# Patient Record
Sex: MALE | Race: BLACK OR AFRICAN AMERICAN | Employment: FULL TIME | URBAN - METROPOLITAN AREA
[De-identification: names, ages, dates, MRNs, and addresses within clinical notes are randomized per-mention and may not be internally consistent; named-entity substitution may affect disease eponyms.]

---

## 2019-08-19 ENCOUNTER — HOSPITAL ENCOUNTER (EMERGENCY)
Age: 19
Discharge: HOME | End: 2019-08-20
Attending: EMERGENCY MEDICINE
Payer: SELF-PAY

## 2019-08-19 VITALS
HEART RATE: 73 BPM | RESPIRATION RATE: 16 BRPM | DIASTOLIC BLOOD PRESSURE: 63 MMHG | OXYGEN SATURATION: 98 % | SYSTOLIC BLOOD PRESSURE: 99 MMHG | WEIGHT: 130 LBS | BODY MASS INDEX: 19.26 KG/M2 | TEMPERATURE: 99 F | HEIGHT: 69 IN

## 2019-08-19 DIAGNOSIS — N34.2 URETHRITIS: Primary | ICD-10-CM

## 2019-08-19 LAB
APPEARANCE UR: ABNORMAL
BACTERIA URNS QL MICRO: 0 /HPF
BILIRUB UR QL: NEGATIVE
CASTS URNS QL MICRO: ABNORMAL /LPF
COLOR UR: YELLOW
EPI CELLS #/AREA URNS HPF: ABNORMAL /HPF
GLUCOSE UR STRIP.AUTO-MCNC: NEGATIVE MG/DL
HGB UR QL STRIP: ABNORMAL
KETONES UR QL STRIP.AUTO: NEGATIVE MG/DL
LEUKOCYTE ESTERASE UR QL STRIP.AUTO: ABNORMAL
NITRITE UR QL STRIP.AUTO: NEGATIVE
OTHER OBSERVATIONS,UCOM: ABNORMAL
PH UR STRIP: 7 [PH] (ref 5–9)
PROT UR STRIP-MCNC: 30 MG/DL
RBC #/AREA URNS HPF: ABNORMAL /HPF
SP GR UR REFRACTOMETRY: 1.02 (ref 1–1.02)
UROBILINOGEN UR QL STRIP.AUTO: 1 EU/DL (ref 0.2–1)
WBC URNS QL MICRO: >100 /HPF

## 2019-08-19 PROCEDURE — 81001 URINALYSIS AUTO W/SCOPE: CPT

## 2019-08-19 PROCEDURE — 74011250636 HC RX REV CODE- 250/636: Performed by: EMERGENCY MEDICINE

## 2019-08-19 PROCEDURE — 99283 EMERGENCY DEPT VISIT LOW MDM: CPT | Performed by: EMERGENCY MEDICINE

## 2019-08-19 PROCEDURE — 87491 CHLMYD TRACH DNA AMP PROBE: CPT

## 2019-08-19 PROCEDURE — 96372 THER/PROPH/DIAG INJ SC/IM: CPT | Performed by: EMERGENCY MEDICINE

## 2019-08-19 PROCEDURE — 74011250637 HC RX REV CODE- 250/637: Performed by: EMERGENCY MEDICINE

## 2019-08-19 RX ORDER — AZITHROMYCIN 250 MG/1
1000 TABLET, FILM COATED ORAL
Status: COMPLETED | OUTPATIENT
Start: 2019-08-19 | End: 2019-08-19

## 2019-08-19 RX ADMIN — AZITHROMYCIN MONOHYDRATE 1000 MG: 250 TABLET ORAL at 23:45

## 2019-08-19 RX ADMIN — LIDOCAINE HYDROCHLORIDE 250 MG: 10 INJECTION, SOLUTION INFILTRATION; PERINEURAL at 23:46

## 2019-08-19 NOTE — LETTER
8/24/2019 Gabriel Stanford [address] [place] [address] Dear David Karen Prajapati, You were recently seen in the Emergency Department of [facility] and had blood work or x-rays performed. We would like to discuss these with you. Please call the Emergency Department at your earliest convenience. If you were seen at [facility], please dial [phone], and if you were seen at [facility], please call [phone] to speak with one of our providers. Sincerely, Georgette Gillespie MD 
Medical Director Emergency Services, [address] [place] [address]  
[phone]/ [phone]

## 2019-08-20 NOTE — ED PROVIDER NOTES
Patient is a 18-year-old male who comes to the ER today complaining of dysuria, and urethral discharge for the past 2 days. He did have unprotected sex with a new partner several times a few days before this. He denies any fever. No vomiting. The history is provided by the patient. Exposure to STD   The incident occurred more than 2 days ago. Associated symptoms include penile discharge. Pertinent negatives include no loss of consciousness, no nausea and no abdominal pain. There is a concern regarding sexually transmitted diseases. He has tried nothing for the symptoms. History reviewed. No pertinent past medical history. History reviewed. No pertinent surgical history. History reviewed. No pertinent family history.     Social History     Socioeconomic History    Marital status: SINGLE     Spouse name: Not on file    Number of children: Not on file    Years of education: Not on file    Highest education level: Not on file   Occupational History    Not on file   Social Needs    Financial resource strain: Not on file    Food insecurity:     Worry: Not on file     Inability: Not on file    Transportation needs:     Medical: Not on file     Non-medical: Not on file   Tobacco Use    Smoking status: Never Smoker    Smokeless tobacco: Never Used   Substance and Sexual Activity    Alcohol use: No    Drug use: No    Sexual activity: Never   Lifestyle    Physical activity:     Days per week: Not on file     Minutes per session: Not on file    Stress: Not on file   Relationships    Social connections:     Talks on phone: Not on file     Gets together: Not on file     Attends Yarsanism service: Not on file     Active member of club or organization: Not on file     Attends meetings of clubs or organizations: Not on file     Relationship status: Not on file    Intimate partner violence:     Fear of current or ex partner: Not on file     Emotionally abused: Not on file     Physically abused: Not on file     Forced sexual activity: Not on file   Other Topics Concern    Not on file   Social History Narrative    Not on file         ALLERGIES: Patient has no known allergies. Review of Systems   Constitutional: Negative for chills, fatigue and fever. HENT: Negative for congestion, rhinorrhea and sore throat. Eyes: Negative for pain, discharge and visual disturbance. Respiratory: Negative for cough and shortness of breath. Cardiovascular: Negative for chest pain and palpitations. Gastrointestinal: Negative for abdominal pain, diarrhea and nausea. Endocrine: Negative for polydipsia and polyuria. Genitourinary: Positive for discharge, dysuria and penile discharge. Negative for frequency and urgency. Musculoskeletal: Negative for back pain and neck pain. Skin: Negative for rash. Neurological: Negative for seizures, loss of consciousness, syncope and weakness. Hematological: Negative. Vitals:    08/19/19 2227   BP: 99/63   Pulse: 73   Resp: 16   Temp: 99 °F (37.2 °C)   SpO2: 98%   Weight: 59 kg (130 lb)   Height: 5' 9\" (1.753 m)            Physical Exam   Constitutional: He is oriented to person, place, and time. He appears well-developed and well-nourished. HENT:   Head: Normocephalic and atraumatic. Eyes: Pupils are equal, round, and reactive to light. Conjunctivae and EOM are normal.   Neck: Normal range of motion. Neck supple. Cardiovascular: Normal rate, regular rhythm and intact distal pulses. Pulmonary/Chest: Effort normal and breath sounds normal.   Abdominal: Soft. There is no tenderness. There is no rebound and no guarding. Hernia confirmed negative in the right inguinal area and confirmed negative in the left inguinal area. Genitourinary: Testes normal. Circumcised. Discharge found. Musculoskeletal: Normal range of motion. He exhibits no edema or tenderness. Lymphadenopathy:     He has no cervical adenopathy.  Inguinal adenopathy noted on the right and left side. Neurological: He is alert and oriented to person, place, and time. He has normal strength. No cranial nerve deficit or sensory deficit. GCS eye subscore is 4. GCS verbal subscore is 5. GCS motor subscore is 6. Skin: Skin is warm and dry. No rash noted. Nursing note and vitals reviewed. MDM  Number of Diagnoses or Management Options  Diagnosis management comments: Clinically patient has urethritis on exam we will empirically treat with Rocephin and Zithromax in the ER tonight. Voice dictation software was used during the making of this note. This software is not perfect and grammatical and other typographical errors may be present. This note has been proofread, but may still contain errors.   Gisela Scruggs MD; 8/19/2019 @11:43 PM   ===================================================================         Amount and/or Complexity of Data Reviewed  Independent visualization of images, tracings, or specimens: yes    Risk of Complications, Morbidity, and/or Mortality  Presenting problems: low  Diagnostic procedures: minimal  Management options: minimal    Patient Progress  Patient progress: stable         Procedures

## 2019-08-20 NOTE — ED NOTES

## 2019-08-20 NOTE — DISCHARGE INSTRUCTIONS
You were empirically treated with antibiotics for sexually transmitted infection here in the emergency department tonight. Make sure all of your partners are evaluated and treated. Practice safe sex.

## 2019-08-22 LAB
C TRACH RRNA SPEC QL NAA+PROBE: POSITIVE
N GONORRHOEA RRNA SPEC QL NAA+PROBE: POSITIVE
SPECIMEN SOURCE: ABNORMAL

## 2019-08-24 NOTE — PROGRESS NOTES
Treated appropriately in the ED. Called to inform the patient. Phone number was wrong, will send a letter.

## 2020-06-22 ENCOUNTER — HOSPITAL ENCOUNTER (EMERGENCY)
Age: 20
Discharge: HOME | End: 2020-06-22
Attending: EMERGENCY MEDICINE
Payer: SELF-PAY

## 2020-06-22 VITALS
HEART RATE: 74 BPM | TEMPERATURE: 98 F | OXYGEN SATURATION: 100 % | DIASTOLIC BLOOD PRESSURE: 70 MMHG | HEIGHT: 69 IN | SYSTOLIC BLOOD PRESSURE: 131 MMHG | RESPIRATION RATE: 16 BRPM | BODY MASS INDEX: 20.73 KG/M2 | WEIGHT: 140 LBS

## 2020-06-22 DIAGNOSIS — R30.0 DYSURIA: Primary | ICD-10-CM

## 2020-06-22 LAB
BACTERIA URNS QL MICRO: 0 /HPF
CASTS URNS QL MICRO: NORMAL /LPF
EPI CELLS #/AREA URNS HPF: NORMAL /HPF
RBC #/AREA URNS HPF: NORMAL /HPF
WBC URNS QL MICRO: NORMAL /HPF

## 2020-06-22 PROCEDURE — 74011250636 HC RX REV CODE- 250/636: Performed by: EMERGENCY MEDICINE

## 2020-06-22 PROCEDURE — 74011250637 HC RX REV CODE- 250/637: Performed by: EMERGENCY MEDICINE

## 2020-06-22 PROCEDURE — 74011000250 HC RX REV CODE- 250: Performed by: EMERGENCY MEDICINE

## 2020-06-22 PROCEDURE — 81015 MICROSCOPIC EXAM OF URINE: CPT

## 2020-06-22 PROCEDURE — 87491 CHLMYD TRACH DNA AMP PROBE: CPT

## 2020-06-22 PROCEDURE — 99283 EMERGENCY DEPT VISIT LOW MDM: CPT

## 2020-06-22 PROCEDURE — 96372 THER/PROPH/DIAG INJ SC/IM: CPT

## 2020-06-22 RX ORDER — AZITHROMYCIN 250 MG/1
1000 TABLET, FILM COATED ORAL
Status: COMPLETED | OUTPATIENT
Start: 2020-06-22 | End: 2020-06-22

## 2020-06-22 RX ADMIN — LIDOCAINE HYDROCHLORIDE 250 MG: 10 INJECTION, SOLUTION INFILTRATION; PERINEURAL at 20:51

## 2020-06-22 RX ADMIN — AZITHROMYCIN 1000 MG: 250 TABLET, FILM COATED ORAL at 20:51

## 2020-06-23 NOTE — ED NOTES
I have reviewed discharge instructions with the patient. The patient verbalized understanding. Patient left ED via Discharge Method: ambulatory to Home with self. Opportunity for questions and clarification provided. Patient given 0 scripts. To continue your aftercare when you leave the hospital, you may receive an automated call from our care team to check in on how you are doing. This is a free service and part of our promise to provide the best care and service to meet your aftercare needs.  If you have questions, or wish to unsubscribe from this service please call [phone]. Thank you for Choosing our [facility] Emergency Department.

## 2020-06-23 NOTE — ED PROVIDER NOTES
Patient states he has had a penile discharge with some dysuria for about a week. He has recently had unprotected sex. He denies any fever or vomiting. He has had an STD before and got treated. He states this feels the same though not as severe as the first time. No past medical history on file. No past surgical history on file. No family history on file. Social History     Socioeconomic History    Marital status: SINGLE     Spouse name: Not on file    Number of children: Not on file    Years of education: Not on file    Highest education level: Not on file   Occupational History    Not on file   Social Needs    Financial resource strain: Not on file    Food insecurity     Worry: Not on file     Inability: Not on file    Transportation needs     Medical: Not on file     Non-medical: Not on file   Tobacco Use    Smoking status: Never Smoker    Smokeless tobacco: Never Used   Substance and Sexual Activity    Alcohol use: No    Drug use: No    Sexual activity: Never   Lifestyle    Physical activity     Days per week: Not on file     Minutes per session: Not on file    Stress: Not on file   Relationships    Social connections     Talks on phone: Not on file     Gets together: Not on file     Attends Moravian service: Not on file     Active member of club or organization: Not on file     Attends meetings of clubs or organizations: Not on file     Relationship status: Not on file    Intimate partner violence     Fear of current or ex partner: Not on file     Emotionally abused: Not on file     Physically abused: Not on file     Forced sexual activity: Not on file   Other Topics Concern    Not on file   Social History Narrative    Not on file         ALLERGIES: Patient has no known allergies. Review of Systems   Constitutional: Negative for chills and fever. Gastrointestinal: Negative for nausea and vomiting. All other systems reviewed and are negative.       Vitals: 06/22/20 1855   BP: 123/73   Pulse: 72   Resp: 20   Temp: 98.1 °F (36.7 °C)   SpO2: 100%   Weight: 63.5 kg (140 lb)   Height: 5' 9\" (1.753 m)            Physical Exam  Vitals signs and nursing note reviewed. Constitutional:       Appearance: Normal appearance. He is well-developed and normal weight. HENT:      Head: Normocephalic and atraumatic. Eyes:      Conjunctiva/sclera: Conjunctivae normal.      Pupils: Pupils are equal, round, and reactive to light. Neck:      Musculoskeletal: Normal range of motion and neck supple. Pulmonary:      Effort: Pulmonary effort is normal. No respiratory distress. Abdominal:      Palpations: Abdomen is soft. Tenderness: There is no abdominal tenderness. Musculoskeletal: Normal range of motion. Skin:     General: Skin is warm and dry. Neurological:      Mental Status: He is alert and oriented to person, place, and time. MDM  Number of Diagnoses or Management Options  Dysuria: new and does not require workup  Diagnosis management comments: 8:33 PM discussed possibility of STD with patient, need for empiric treatment. He is agreeable, has a primary doctor he can follow-up with.        Amount and/or Complexity of Data Reviewed  Clinical lab tests: ordered and reviewed    Risk of Complications, Morbidity, and/or Mortality  Presenting problems: moderate  Diagnostic procedures: moderate  Management options: moderate    Patient Progress  Patient progress: improved         Procedures

## 2020-06-25 LAB
C TRACH RRNA SPEC QL NAA+PROBE: NEGATIVE
N GONORRHOEA RRNA SPEC QL NAA+PROBE: POSITIVE
SPECIMEN SOURCE: ABNORMAL

## 2020-06-25 NOTE — PROGRESS NOTES
[facility] Emergency Department  STI Recheck Note    Harman Acosta  Phone: [phone] (home)    YOB: 2000   SSN: [id number]     Chlamydia: Lab Results       Component                Value               Date/Time                  CTLT                     Negative            06/22/2020 06:57 PM   Gonorrhea: Lab Results       Component                Value               Date/Time                  NGLT                     Positive (A)        06/22/2020 06:57 PM   Syphillis: No results found for: RPR    Orders Placed This Encounter      URINE MICROSCOPIC      CHLAMYDIA / GC-AMPLIFIED      cefTRIAXone (ROCEPHIN) 250 mg in lidocaine (XYLOCAINE) 10 mg/mL (1 %) IM syringe      azithromycin (ZITHROMAX) tablet 1,000 mg      Patient needs: notification  Patient called. Patient notified. All questions answered at this time.      ARIEL Negron; 6/25/2020 @11:01 AM===========================================

## 2020-11-14 ENCOUNTER — HOSPITAL ENCOUNTER (EMERGENCY)
Age: 20
Discharge: HOME | End: 2020-11-14
Attending: EMERGENCY MEDICINE

## 2020-11-14 VITALS
SYSTOLIC BLOOD PRESSURE: 109 MMHG | HEART RATE: 71 BPM | WEIGHT: 135 LBS | DIASTOLIC BLOOD PRESSURE: 61 MMHG | BODY MASS INDEX: 19.33 KG/M2 | OXYGEN SATURATION: 98 % | HEIGHT: 70 IN | RESPIRATION RATE: 16 BRPM | TEMPERATURE: 98.1 F

## 2020-11-14 DIAGNOSIS — N34.2 URETHRITIS: Primary | ICD-10-CM

## 2020-11-14 PROCEDURE — 74011250636 HC RX REV CODE- 250/636: Performed by: EMERGENCY MEDICINE

## 2020-11-14 PROCEDURE — 74011000250 HC RX REV CODE- 250: Performed by: EMERGENCY MEDICINE

## 2020-11-14 PROCEDURE — 99282 EMERGENCY DEPT VISIT SF MDM: CPT

## 2020-11-14 PROCEDURE — 74011250637 HC RX REV CODE- 250/637: Performed by: EMERGENCY MEDICINE

## 2020-11-14 PROCEDURE — 96372 THER/PROPH/DIAG INJ SC/IM: CPT

## 2020-11-14 PROCEDURE — 87491 CHLMYD TRACH DNA AMP PROBE: CPT

## 2020-11-14 RX ORDER — AZITHROMYCIN 250 MG/1
1000 TABLET, FILM COATED ORAL
Status: COMPLETED | OUTPATIENT
Start: 2020-11-14 | End: 2020-11-14

## 2020-11-14 RX ORDER — ONDANSETRON 4 MG/1
4 TABLET, ORALLY DISINTEGRATING ORAL
Status: COMPLETED | OUTPATIENT
Start: 2020-11-14 | End: 2020-11-14

## 2020-11-14 RX ADMIN — AZITHROMYCIN DIHYDRATE 1000 MG: 250 TABLET, FILM COATED ORAL at 15:50

## 2020-11-14 RX ADMIN — CEFTRIAXONE SODIUM 250 MG: 250 INJECTION, POWDER, FOR SOLUTION INTRAMUSCULAR; INTRAVENOUS at 15:50

## 2020-11-14 RX ADMIN — ONDANSETRON 4 MG: 4 TABLET, ORALLY DISINTEGRATING ORAL at 15:50

## 2020-11-14 NOTE — LETTER
11/20/2020 Anna Caballero [address] [place] [address] Dear  Kvng Niki, You were recently seen in the Emergency Department of [facility] and had blood work or x-rays performed. We would like to discuss these with you. Please call the Emergency Department at your earliest convenience. If you were seen at [facility], please dial [phone], and if you were seen at [facility], please call [phone] to speak with one of our providers. Sincerely, Medical Director Emergency Services, [address] [place] [address]  
[phone]/ [phone]

## 2020-11-14 NOTE — ED TRIAGE NOTES
Pt states his sexual partner was exposed chlamydia, and would like to be treated. Pt denies discharge, penile / urinary pain.

## 2020-11-14 NOTE — DISCHARGE INSTRUCTIONS
Follow up with the health department or your regular doctor    We are treating and testing you for gonorrhea -- there are many other sexually transmitted that are not as easy to treat.     Wear a condom

## 2020-11-14 NOTE — ED PROVIDER NOTES
[facility] EMERGENCY DEPT   Arrival Date/Time: 11/14/2020 @ [address]  MRN: [id number]      23 y.o. male    YOB: 2000   Telephone Information:   Mobile [phone] (home)     Seen in: [facility]  Seen on 11/14/2020 @ 4:12 PM      Today's Chief Complaint:   Chief Complaint   Patient presents with    Exposure to STD     HPI (1-3): 22 yo male presents for STI testing  sts SO was positive for chlamydia   HPI    Historian: patient    Review of Systems (1): .addros     . zaddrostable   No fever  No chills  No dysuria    Allergies: No Known Allergies      Key Anti-Platelet Anticoagulant Meds     The patient is on no antiplatelet meds or anticoagulants. Physical Exam (2-4):  Nursing documentation reviewed. Vitals:    11/14/20 1536   BP: 109/61   Pulse: 71   Resp: 16   Temp: 98.1 °F (36.7 °C)   SpO2: 98%    Vital signs were reviewed. Physical Exam  Vitals signs and nursing note reviewed. Constitutional:       Appearance: Normal appearance. Neurological:      Mental Status: He is alert and oriented to person, place, and time. MEDICAL DECISION MAKING: (Including Differential Dx, and Plan)   Treat for GC/C  Refer to PMD and health department      This is a new problem that does not need additional workup     The patient's problem is:  minimal    Diagnostic Options are:  none    Management Options are:  moderate risk     Other ED Course Notes:        I wore appropriate PPE throughout this patient's ED encounter. Procedure Documentation: Procedures     Impression and Disposition: (.goupstairs   . addhandoff  )     Disposition:   Discharge to home @ 4:12 PM in stable Condition     Impression:     ICD-10-CM ICD-9-CM   1.  Urethritis  N34.2 597.80        Follow-up:   Follow-up Information     Follow up With Specialties Details Why [address] [facility] EMERGENCY DEPT Emergency Medicine  Come back to the ED if you get worse or develop any new problems [address] [phone]         Discharge Medications: There are no discharge medications for this patient. Past Medical History: Primary Care Doctor: Miller, MD Srinivasa   History reviewed. No pertinent past medical history. History reviewed. No pertinent surgical history. History reviewed. No pertinent family history. Social History     Tobacco Use    Smoking status: Current Some Day Smoker     Packs/day: 0.25     Years: 3.00     Pack years: 0.75    Smokeless tobacco: Never Used   Substance Use Topics    Alcohol use:  Yes     Alcohol/week: 3.0 standard drinks     Types: 3 Cans of beer per week    Drug use: Yes     Frequency: 3.0 times per week     Types: Marijuana      None

## 2020-11-14 NOTE — ED NOTES
I have reviewed discharge instructions with the patient. The patient verbalized understanding. Patient left ED via Discharge Method: ambulatory to Home with self    Opportunity for questions and clarification provided. Patient given 0 scripts. To continue your aftercare when you leave the hospital, you may receive an automated call from our care team to check in on how you are doing. This is a free service and part of our promise to provide the best care and service to meet your aftercare needs.  If you have questions, or wish to unsubscribe from this service please call [phone]. Thank you for Choosing our [facility] Emergency Department.

## 2020-11-14 NOTE — LETTER
11/20/2020 Camilokenton Patelck [address] [facility] [address] Dear  Shelby Dominick, You were recently seen in the Emergency Department of [facility] and had blood work or x-rays performed. We would like to discuss these with you. Please call the Emergency Department at your earliest convenience. If you were seen at [facility], please dial [phone], and if you were seen at [facility], please call [phone] to speak with one of our providers. Sincerely, 
 
Cheryl Brand MD 
Medical Director Emergency Services, [address] [place] [address]  
[phone]/ [phone]

## 2020-11-19 LAB
C TRACH RRNA SPEC QL NAA+PROBE: POSITIVE
N GONORRHOEA RRNA SPEC QL NAA+PROBE: NEGATIVE
SPECIMEN SOURCE: ABNORMAL

## 2021-06-28 ENCOUNTER — HOSPITAL ENCOUNTER (EMERGENCY)
Age: 21
Discharge: HOME | End: 2021-06-28
Attending: STUDENT IN AN ORGANIZED HEALTH CARE EDUCATION/TRAINING PROGRAM

## 2021-06-28 VITALS
WEIGHT: 130 LBS | RESPIRATION RATE: 14 BRPM | DIASTOLIC BLOOD PRESSURE: 69 MMHG | OXYGEN SATURATION: 98 % | TEMPERATURE: 98.9 F | HEART RATE: 75 BPM | BODY MASS INDEX: 19.26 KG/M2 | SYSTOLIC BLOOD PRESSURE: 115 MMHG | HEIGHT: 69 IN

## 2021-06-28 DIAGNOSIS — R36.9 PENILE DISCHARGE: Primary | ICD-10-CM

## 2021-06-28 PROCEDURE — 99282 EMERGENCY DEPT VISIT SF MDM: CPT

## 2021-06-28 PROCEDURE — 74011000250 HC RX REV CODE- 250: Performed by: PHYSICIAN ASSISTANT

## 2021-06-28 PROCEDURE — 96372 THER/PROPH/DIAG INJ SC/IM: CPT

## 2021-06-28 PROCEDURE — 74011250636 HC RX REV CODE- 250/636: Performed by: PHYSICIAN ASSISTANT

## 2021-06-28 RX ORDER — DOXYCYCLINE HYCLATE 100 MG
100 TABLET ORAL 2 TIMES DAILY
Qty: 14 TABLET | Refills: 0 | Status: SHIPPED | OUTPATIENT
Start: 2021-06-28 | End: 2021-07-05

## 2021-06-28 RX ORDER — DOXYCYCLINE HYCLATE 100 MG
100 TABLET ORAL 2 TIMES DAILY
Qty: 14 TABLET | Refills: 0 | Status: SHIPPED | OUTPATIENT
Start: 2021-06-28 | End: 2021-06-28 | Stop reason: SDUPTHER

## 2021-06-28 RX ADMIN — LIDOCAINE HYDROCHLORIDE 500 MG: 10 INJECTION, SOLUTION INFILTRATION; PERINEURAL at 01:03

## 2021-06-28 NOTE — DISCHARGE INSTRUCTIONS
You have been treated for gonorrhea and chlamydia here in the emergency room tonight. You been instructed to follow-up with the health department for more comprehensive STD testing.

## 2021-06-28 NOTE — ED TRIAGE NOTES
The pt c/o burning at the tip of his penis with green discharge. The pt stated that they symptoms started today.

## 2021-06-28 NOTE — ED NOTES
Discharge instructions and a prescription given to and discussed with patient. All questions answered at this time. Pt left department in NAD with a steady gait.

## 2021-06-28 NOTE — ED PROVIDER NOTES
25-year-old male with chief complaint of greenish/clear discharge from his penis starting yesterday. Patient states he has had gonorrhea/chlamydia previously, thinks this is a recurrence. Does endorse recent unprotected sexual encounters with a new partner. Patient endorses that he has an appointment scheduled with the health department tomorrow morning. Would like to be treated tonight. No other medical complaints. History reviewed. No pertinent past medical history. No past surgical history on file. History reviewed. No pertinent family history. Social History     Socioeconomic History    Marital status: SINGLE     Spouse name: Not on file    Number of children: Not on file    Years of education: Not on file    Highest education level: Not on file   Occupational History    Not on file   Tobacco Use    Smoking status: Current Some Day Smoker     Packs/day: 0.25     Years: 3.00     Pack years: 0.75    Smokeless tobacco: Never Used   Substance and Sexual Activity    Alcohol use: Yes     Alcohol/week: 3.0 standard drinks     Types: 3 Cans of beer per week    Drug use: Yes     Frequency: 3.0 times per week     Types: Marijuana    Sexual activity: Yes     Partners: Female     Birth control/protection: None, Condom   Other Topics Concern    Not on file   Social History Narrative    Not on file     Social Determinants of Health     Financial Resource Strain:     Difficulty of Paying Living Expenses:    Food Insecurity:     Worried About Running Out of Food in the Last Year:     Ran Out of Food in the Last Year:    Transportation Needs:     Lack of Transportation (Medical):      Lack of Transportation (Non-Medical):    Physical Activity:     Days of Exercise per Week:     Minutes of Exercise per Session:    Stress:     Feeling of Stress :    Social Connections:     Frequency of Communication with Friends and Family:     Frequency of Social Gatherings with Friends and Family:  Attends Hinduism Services:     Active Member of Clubs or Organizations:     Attends Club or Organization Meetings:     Marital Status:    Intimate Partner Violence:     Fear of Current or Ex-Partner:     Emotionally Abused:     Physically Abused:     Sexually Abused: ALLERGIES: Patient has no known allergies. Review of Systems   Constitutional: Negative for chills and fever. HENT: Negative for facial swelling. Respiratory: Negative for chest tightness and shortness of breath. Cardiovascular: Negative for chest pain. Gastrointestinal: Negative for abdominal pain, nausea and vomiting. Genitourinary: Positive for discharge. Musculoskeletal: Negative for myalgias. Neurological: Negative for headaches. Psychiatric/Behavioral: Negative for confusion. All other systems reviewed and are negative. Vitals:    06/28/21 0051   BP: 115/69   Pulse: 75   Resp: 14   Temp: 98.9 °F (37.2 °C)   SpO2: 98%   Weight: 59 kg (130 lb)   Height: 5' 9\" (1.753 m)            Physical Exam  Vitals and nursing note reviewed. Constitutional:       Appearance: Normal appearance. HENT:      Head: Normocephalic and atraumatic. Mouth/Throat:      Mouth: Mucous membranes are moist.   Eyes:      Pupils: Pupils are equal, round, and reactive to light. Cardiovascular:      Rate and Rhythm: Normal rate and regular rhythm. Pulmonary:      Effort: No respiratory distress. Breath sounds: Normal breath sounds. Abdominal:      Palpations: Abdomen is soft. Tenderness: There is no abdominal tenderness. There is no guarding. Genitourinary:     Comments: Exam deferred  Skin:     General: Skin is warm and dry. Neurological:      Mental Status: He is alert and oriented to person, place, and time. Mental status is at baseline.    Psychiatric:         Mood and Affect: Mood normal.          MDM  Number of Diagnoses or Management Options  Penile discharge  Diagnosis management comments: Due to patient having multiple positive tests in the past for gonorrhea/chlamydia, does have a recurrence of a greenish discharge tonight, patient will be treated, we elected not to test patient. Patient was given 500 mg Rocephin IM here in the emergency room, sent with a prescription for doxycycline. Patient does endorse that he has a an appointment with health department tomorrow morning, I reinforced the patient is to keep this appointment for more comprehensive STD testing including HIV. Patient verbalizes understanding. At time of discharge patient is resting comfortably in no acute distress.     Risk of Complications, Morbidity, and/or Mortality  Presenting problems: low  Diagnostic procedures: low  Management options: low           Procedures

## 2022-02-19 ENCOUNTER — HOSPITAL ENCOUNTER (EMERGENCY)
Age: 22
Discharge: HOME | End: 2022-02-19
Attending: EMERGENCY MEDICINE

## 2022-02-19 VITALS
BODY MASS INDEX: 19.99 KG/M2 | RESPIRATION RATE: 16 BRPM | WEIGHT: 135 LBS | HEART RATE: 91 BPM | HEIGHT: 69 IN | OXYGEN SATURATION: 95 % | TEMPERATURE: 101 F | DIASTOLIC BLOOD PRESSURE: 64 MMHG | SYSTOLIC BLOOD PRESSURE: 107 MMHG

## 2022-02-19 DIAGNOSIS — J02.9 EXUDATIVE PHARYNGITIS: Primary | ICD-10-CM

## 2022-02-19 PROCEDURE — 99283 EMERGENCY DEPT VISIT LOW MDM: CPT

## 2022-02-19 PROCEDURE — 74011250637 HC RX REV CODE- 250/637: Performed by: PHYSICIAN ASSISTANT

## 2022-02-19 RX ORDER — PENICILLIN V POTASSIUM 500 MG/1
500 TABLET, FILM COATED ORAL 2 TIMES DAILY
Qty: 20 TABLET | Refills: 0 | Status: SHIPPED | OUTPATIENT
Start: 2022-02-19 | End: 2022-03-01

## 2022-02-19 RX ORDER — ACETAMINOPHEN 325 MG/1
650 TABLET ORAL
Status: COMPLETED | OUTPATIENT
Start: 2022-02-19 | End: 2022-02-19

## 2022-02-19 RX ORDER — IBUPROFEN 800 MG/1
800 TABLET ORAL
Status: COMPLETED | OUTPATIENT
Start: 2022-02-19 | End: 2022-02-19

## 2022-02-19 RX ADMIN — ACETAMINOPHEN 650 MG: 325 TABLET, FILM COATED ORAL at 00:28

## 2022-02-19 RX ADMIN — IBUPROFEN 800 MG: 800 TABLET, FILM COATED ORAL at 00:28

## 2022-02-19 NOTE — ED TRIAGE NOTES
Pt presents to ED with sore throat since Monday. Denies N/V/D and fever. Pt states it is painful to swallow. Masked.

## 2022-02-19 NOTE — DISCHARGE INSTRUCTIONS
Take the antibiotics as prescribed, take Tylenol and ibuprofen every 6 hours for pain and fever  Drink plenty of fluids, gargle with salt water and follow-up with primary doctor as needed. Return for emergent or severely worsening symptoms.

## 2022-02-19 NOTE — ED NOTES
I have reviewed discharge instructions with the patient. The patient verbalized understanding. Patient left ED via Discharge Method: ambulatory to Home with self. Opportunity for questions and clarification provided. Patient given 1 scripts. To continue your aftercare when you leave the hospital, you may receive an automated call from our care team to check in on how you are doing. This is a free service and part of our promise to provide the best care and service to meet your aftercare needs.  If you have questions, or wish to unsubscribe from this service please call [phone]. Thank you for Choosing our [facility] Emergency Department.

## 2022-02-19 NOTE — ED PROVIDER NOTES
70-year-old previously healthy male comes in with a sore throat and tonsillar swelling with white spots in the back of his throat. He says that his symptoms of sore throat started on Monday and then he looked in the back of his throat last night and noticed white spots and swelling to the tonsils. He denies shortness of breath difficulty breathing cough or congestion. He denies known sick contacts. No past medical history on file. No past surgical history on file. No family history on file. Social History     Socioeconomic History    Marital status: SINGLE     Spouse name: Not on file    Number of children: Not on file    Years of education: Not on file    Highest education level: Not on file   Occupational History    Not on file   Tobacco Use    Smoking status: Current Some Day Smoker     Packs/day: 0.25     Years: 3.00     Pack years: 0.75    Smokeless tobacco: Never Used   Substance and Sexual Activity    Alcohol use: Yes     Alcohol/week: 3.0 standard drinks     Types: 3 Cans of beer per week    Drug use: Yes     Frequency: 3.0 times per week     Types: Marijuana    Sexual activity: Yes     Partners: Female     Birth control/protection: None, Condom   Other Topics Concern    Not on file   Social History Narrative    Not on file     Social Determinants of Health     Financial Resource Strain:     Difficulty of Paying Living Expenses: Not on file   Food Insecurity:     Worried About Running Out of Food in the Last Year: Not on file    Eliana of Food in the Last Year: Not on file   Transportation Needs:     Lack of Transportation (Medical): Not on file    Lack of Transportation (Non-Medical):  Not on file   Physical Activity:     Days of Exercise per Week: Not on file    Minutes of Exercise per Session: Not on file   Stress:     Feeling of Stress : Not on file   Social Connections:     Frequency of Communication with Friends and Family: Not on file    Frequency of Social Gatherings with Friends and Family: Not on file    Attends Bahai Services: Not on file    Active Member of Clubs or Organizations: Not on file    Attends Club or Organization Meetings: Not on file    Marital Status: Not on file   Intimate Partner Violence:     Fear of Current or Ex-Partner: Not on file    Emotionally Abused: Not on file    Physically Abused: Not on file    Sexually Abused: Not on file   Housing Stability:     Unable to Pay for Housing in the Last Year: Not on file    Number of [place] in the Last Year: Not on file    Unstable Housing in the Last Year: Not on file         ALLERGIES: Patient has no known allergies. Review of Systems   Constitutional: Positive for fever. Negative for activity change. HENT: Positive for sore throat. Negative for congestion, ear pain, rhinorrhea, sneezing and tinnitus. Respiratory: Negative for cough and shortness of breath. Cardiovascular: Negative for chest pain. Gastrointestinal: Negative for abdominal pain, nausea and vomiting. Skin: Negative for rash. Neurological: Negative for speech difficulty and numbness. All other systems reviewed and are negative. Vitals:    02/19/22 0021   BP: 107/64   Pulse: 91   Resp: 16   Temp: (!) 101 °F (38.3 °C)   SpO2: 95%   Weight: 61.2 kg (135 lb)   Height: 5' 9\" (1.753 m)            Physical Exam  Vitals and nursing note reviewed. Constitutional:       General: He is not in acute distress. Appearance: Normal appearance. He is well-developed and normal weight. He is not ill-appearing, toxic-appearing or diaphoretic. HENT:      Head: Normocephalic and atraumatic. Right Ear: Tympanic membrane and ear canal normal.      Left Ear: Tympanic membrane and ear canal normal.      Nose: No congestion or rhinorrhea. Mouth/Throat:      Mouth: Mucous membranes are moist. No oral lesions. Pharynx: Uvula midline.  Pharyngeal swelling, oropharyngeal exudate and posterior oropharyngeal erythema present. No uvula swelling. Tonsils: Tonsillar exudate present. No tonsillar abscesses. 1+ on the right. 1+ on the left. Eyes:      Conjunctiva/sclera: Conjunctivae normal.      Pupils: Pupils are equal, round, and reactive to light. Neck:      Trachea: Trachea normal.   Cardiovascular:      Rate and Rhythm: Normal rate and regular rhythm. Heart sounds: Normal heart sounds. No murmur heard. No friction rub. No gallop. Pulmonary:      Effort: Pulmonary effort is normal.      Breath sounds: Normal breath sounds. Musculoskeletal:      Cervical back: Full passive range of motion without pain, normal range of motion and neck supple. Lymphadenopathy:      Cervical: Cervical adenopathy present. Right cervical: Superficial cervical adenopathy present. No deep or posterior cervical adenopathy. Left cervical: Superficial cervical adenopathy present. No deep or posterior cervical adenopathy. Skin:     General: Skin is warm and dry. Neurological:      Mental Status: He is alert. MDM  Number of Diagnoses or Management Options  Exudative pharyngitis: new and requires workup  Diagnosis management comments: Patient here with exudative pharyngitis, he meets [facility] criteria for empiric treatment with antibiotics and will be treated with penicillin. Noted to be febrile here, given a dose of Tylenol and ibuprofen for the fever. Advised to drink plenty of fluids and rest and return for emergent or severely worsening symptoms. He is otherwise well-appearing and nontoxic.          Procedures

## 2022-03-18 PROBLEM — J02.9 EXUDATIVE PHARYNGITIS: Status: ACTIVE | Noted: 2022-02-19

## 2022-08-03 ENCOUNTER — HOSPITAL ENCOUNTER (EMERGENCY)
Age: 22
Discharge: HOME | End: 2022-08-03
Attending: STUDENT IN AN ORGANIZED HEALTH CARE EDUCATION/TRAINING PROGRAM

## 2022-08-03 VITALS
BODY MASS INDEX: 24.44 KG/M2 | DIASTOLIC BLOOD PRESSURE: 68 MMHG | WEIGHT: 165 LBS | RESPIRATION RATE: 16 BRPM | HEART RATE: 77 BPM | TEMPERATURE: 98 F | SYSTOLIC BLOOD PRESSURE: 121 MMHG | HEIGHT: 69 IN | OXYGEN SATURATION: 99 %

## 2022-08-03 DIAGNOSIS — A64 STD (MALE): Primary | ICD-10-CM

## 2022-08-03 LAB
APPEARANCE UR: CLEAR
BACTERIA URNS QL MICRO: NEGATIVE /HPF
BILIRUB UR QL: NEGATIVE
CASTS URNS QL MICRO: ABNORMAL /LPF
COLOR UR: ABNORMAL
EPI CELLS #/AREA URNS HPF: ABNORMAL /HPF
GLUCOSE UR STRIP.AUTO-MCNC: NEGATIVE MG/DL
HGB UR QL STRIP: NEGATIVE
KETONES UR QL STRIP.AUTO: NEGATIVE MG/DL
LEUKOCYTE ESTERASE UR QL STRIP.AUTO: ABNORMAL
NITRITE UR QL STRIP.AUTO: NEGATIVE
PH UR STRIP: 6 [PH] (ref 5–9)
PROT UR STRIP-MCNC: NEGATIVE MG/DL
RBC #/AREA URNS HPF: ABNORMAL /HPF
SP GR UR REFRACTOMETRY: 1.02 (ref 1–1.02)
UROBILINOGEN UR QL STRIP.AUTO: 0.2 EU/DL (ref 0.2–1)
WBC URNS QL MICRO: ABNORMAL /HPF

## 2022-08-03 PROCEDURE — 2500000003 HC RX 250 WO HCPCS: Performed by: PHYSICIAN ASSISTANT

## 2022-08-03 PROCEDURE — 96372 THER/PROPH/DIAG INJ SC/IM: CPT

## 2022-08-03 PROCEDURE — 99284 EMERGENCY DEPT VISIT MOD MDM: CPT

## 2022-08-03 PROCEDURE — 6360000002 HC RX W HCPCS: Performed by: PHYSICIAN ASSISTANT

## 2022-08-03 PROCEDURE — 6370000000 HC RX 637 (ALT 250 FOR IP): Performed by: PHYSICIAN ASSISTANT

## 2022-08-03 PROCEDURE — 81001 URINALYSIS AUTO W/SCOPE: CPT

## 2022-08-03 PROCEDURE — 87491 CHLMYD TRACH DNA AMP PROBE: CPT

## 2022-08-03 RX ORDER — METRONIDAZOLE 500 MG/1
2000 TABLET ORAL ONCE
Status: COMPLETED | OUTPATIENT
Start: 2022-08-03 | End: 2022-08-03

## 2022-08-03 RX ORDER — DOXYCYCLINE HYCLATE 100 MG
100 TABLET ORAL 2 TIMES DAILY
Qty: 14 TABLET | Refills: 0 | Status: SHIPPED | OUTPATIENT
Start: 2022-08-03 | End: 2022-08-10

## 2022-08-03 RX ADMIN — METRONIDAZOLE 2000 MG: 500 TABLET ORAL at 08:44

## 2022-08-03 RX ADMIN — LIDOCAINE HYDROCHLORIDE 500 MG: 10 INJECTION, SOLUTION INFILTRATION; PERINEURAL at 08:45

## 2022-08-03 ASSESSMENT — ENCOUNTER SYMPTOMS
COUGH: 0
NAUSEA: 0
SORE THROAT: 0
VOMITING: 0
SHORTNESS OF BREATH: 0
BACK PAIN: 0
ABDOMINAL PAIN: 0
DIARRHEA: 0
EYE REDNESS: 0

## 2022-08-03 ASSESSMENT — PAIN DESCRIPTION - DESCRIPTORS: DESCRIPTORS: BURNING

## 2022-08-03 ASSESSMENT — LIFESTYLE VARIABLES
HOW MANY STANDARD DRINKS CONTAINING ALCOHOL DO YOU HAVE ON A TYPICAL DAY: 1 OR 2
HOW OFTEN DO YOU HAVE A DRINK CONTAINING ALCOHOL: MONTHLY OR LESS

## 2022-08-03 ASSESSMENT — PAIN SCALES - GENERAL
PAINLEVEL_OUTOF10: 4
PAINLEVEL_OUTOF10: 4

## 2022-08-03 ASSESSMENT — PAIN - FUNCTIONAL ASSESSMENT: PAIN_FUNCTIONAL_ASSESSMENT: 0-10

## 2022-08-03 ASSESSMENT — PAIN DESCRIPTION - LOCATION: LOCATION: PENIS

## 2022-08-03 NOTE — ED TRIAGE NOTES
Pt states that he noticed burning with urination several days ago and his most recent sexual partner tested positive for trich 2 days ago.   Masked on arrival

## 2022-08-03 NOTE — ED NOTES
I have reviewed discharge instructions with the patient. The patient verbalized understanding. Patient left ED via Discharge Method: ambulatory to Home with self). Opportunity for questions and clarification provided. Patient given 1 scripts. To continue your aftercare when you leave the hospital, you may receive an automated call from our care team to check in on how you are doing. This is a free service and part of our promise to provide the best care and service to meet your aftercare needs.  If you have questions, or wish to unsubscribe from this service please call [phone]. Thank you for Choosing our [facility] Emergency Department.        Huma Donahue RN  08/03/22 8923

## 2022-08-03 NOTE — DISCHARGE INSTRUCTIONS
Take full course of antibiotics doxycycline twice daily for the next 7 days. Refrain from all sexual activity for the next 7 to 10 days, initiated antibiotics and complete resolution of symptoms. Notify all previous partners to be tested and treated.

## 2022-08-03 NOTE — ED PROVIDER NOTES
[facility] Emergency Department Provider Note                   PCP:                None Provider               Age: 20 y.o. Sex: male       ICD-10-CM    1. STD (male)  [address] Discharge - Pending Orders Complete 08/03/2022 08:27:27 AM        MDM  Number of Diagnoses or Management Options  STD (female)  Diagnosis management comments: Patient is a 58-year-old male who presents with concern for STD. He has had dysuria for the last 3 days and advised partner that he should be treated for trichomonas. He is afebrile, nontoxic in appearance, vital signs within appropriate limits. Urine sample is sent for gonorrhea and Chlamydia cultures. Did discuss with patient likely had multiple STDs and he would to be treated for both gonorrhea and chlamydia at this time in addition to trichomonas. He was given 2 g of Flagyl p.o. and 500 mg Rocephin IM. Will DC with 7-day course of doxycycline. Advised to refrain from all sexual activity until complete resolution of symptoms in 7 to 10 days. Advised to notify all previous partners. He was given information for follow-up with Sinan Lowe for any further inquiry. Discussed reasons to return to the ER. Patient agreed with plan. Orders Placed This Encounter   Procedures    C.trachomatis N.gonorrhoeae DNA    Urinalysis w rflx microscopic        Ball Nett is a 20 y.o. male who presents to the Emergency Department with chief complaint of    Chief Complaint   Patient presents with    Exposure to STD      Patient is a 58-year-old male who presents with complaint of concern for STD. He has burning discomfort with urination for the last 3 days and was notified by previous partner that they tested positive for trichomonas. Patient denies any penile discharge, testicular pain, penile lesions or rash, abdominal pain, fevers or chills. The history is provided by the patient.        Review of Systems   Constitutional:  Negative for activity change, appetite change, chills, fatigue and fever. HENT:  Negative for congestion and sore throat. Eyes:  Negative for redness. Respiratory:  Negative for cough and shortness of breath. Cardiovascular:  Negative for chest pain. Gastrointestinal:  Negative for abdominal pain, diarrhea, nausea and vomiting. Genitourinary:  Positive for dysuria. Negative for frequency, hematuria, penile discharge, penile pain, penile swelling, scrotal swelling and testicular pain. Musculoskeletal:  Negative for back pain and neck pain. Skin:  Negative for rash. Neurological:  Negative for light-headedness and headaches. Psychiatric/Behavioral:  Negative for confusion. History reviewed. No pertinent past medical history. History reviewed. No pertinent surgical history. History reviewed. No pertinent family history. Social History     Socioeconomic History    Marital status: Single     Spouse name: None    Number of children: None    Years of education: None    Highest education level: None   Tobacco Use    Smoking status: Some Days     Packs/day: 0.25     Types: Cigarettes    Smokeless tobacco: Never   Substance and Sexual Activity    Alcohol use: Yes     Alcohol/week: 3.0 standard drinks    Drug use: Yes     Frequency: 3.0 times per week     Types: Marijuana Danielle Kubrenda)    Sexual activity: Yes     Partners: Female     Birth control/protection: Condom, None         Patient has no known allergies. Previous Medications    No medications on file        Vitals signs and nursing note reviewed. Patient Vitals for the past 4 hrs:   Temp Pulse Resp BP SpO2   08/03/22 0613 98.6 °F (37 °C) 73 16 111/66 99 %          Physical Exam  Vitals and nursing note reviewed. Constitutional:       General: He is not in acute distress. Appearance: He is not ill-appearing or toxic-appearing. HENT:      Head: Normocephalic and atraumatic.    Pulmonary:      Effort: Pulmonary effort is normal.   Abdominal: General: There is no distension. Palpations: Abdomen is soft. Tenderness: There is no abdominal tenderness. Genitourinary:     Comments: Pt deferred  Skin:     General: Skin is warm and dry. Neurological:      Mental Status: He is alert and oriented to person, place, and time. Psychiatric:         Mood and Affect: Mood normal.         Behavior: Behavior normal.        Procedures      Labs Reviewed   URINALYSIS - Abnormal; Notable for the following components:       Result Value    Leukocyte Esterase, Urine SMALL (*)     All other components within normal limits   C.TRACHOMATIS N.GONORRHOEAE DNA        No orders to display                          Voice dictation software was used during the making of this note. This software is not perfect and grammatical and other typographical errors may be present. This note has not been completely proofread for errors.      Nathanael SARMIENTO  08/03/22 6447

## 2022-08-05 LAB
C TRACH RRNA SPEC QL NAA+PROBE: NEGATIVE
N GONORRHOEA RRNA SPEC QL NAA+PROBE: NEGATIVE
SPECIMEN SOURCE: NORMAL

## 2023-07-05 ENCOUNTER — HOSPITAL ENCOUNTER (EMERGENCY)
Age: 23
Discharge: HOME | End: 2023-07-05
Attending: STUDENT IN AN ORGANIZED HEALTH CARE EDUCATION/TRAINING PROGRAM

## 2023-07-05 ENCOUNTER — APPOINTMENT (OUTPATIENT)
Dept: CT IMAGING | Age: 23
End: 2023-07-05

## 2023-07-05 ENCOUNTER — APPOINTMENT (OUTPATIENT)
Dept: GENERAL RADIOLOGY | Age: 23
End: 2023-07-05

## 2023-07-05 VITALS
HEIGHT: 69 IN | DIASTOLIC BLOOD PRESSURE: 70 MMHG | BODY MASS INDEX: 19.99 KG/M2 | OXYGEN SATURATION: 96 % | RESPIRATION RATE: 20 BRPM | HEART RATE: 103 BPM | WEIGHT: 135 LBS | SYSTOLIC BLOOD PRESSURE: 117 MMHG | TEMPERATURE: 99.3 F

## 2023-07-05 DIAGNOSIS — S02.5XXA CLOSED FRACTURE OF TOOTH, INITIAL ENCOUNTER: ICD-10-CM

## 2023-07-05 DIAGNOSIS — S00.93XA CONTUSION OF HEAD, UNSPECIFIED PART OF HEAD, INITIAL ENCOUNTER: ICD-10-CM
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
## 2023-07-05 DIAGNOSIS — T14.8XXA SUPERFICIAL ABRASION: ICD-10-CM

## 2023-07-05 DIAGNOSIS — F10.920 ACUTE ALCOHOLIC INTOXICATION WITHOUT COMPLICATION (HCC): ICD-10-CM

## 2023-07-05 PROCEDURE — 99284 EMERGENCY DEPT VISIT MOD MDM: CPT

## 2023-07-05 PROCEDURE — 73030 X-RAY EXAM OF SHOULDER: CPT

## 2023-07-05 PROCEDURE — 70450 CT HEAD/BRAIN W/O DYE: CPT

## 2023-07-05 PROCEDURE — 73130 X-RAY EXAM OF HAND: CPT

## 2023-07-05 ASSESSMENT — PAIN SCALES - GENERAL: PAINLEVEL_OUTOF10: 4

## 2023-07-05 ASSESSMENT — LIFESTYLE VARIABLES
HOW OFTEN DO YOU HAVE A DRINK CONTAINING ALCOHOL: 2-4 TIMES A MONTH
HOW MANY STANDARD DRINKS CONTAINING ALCOHOL DO YOU HAVE ON A TYPICAL DAY: 3 OR 4

## 2023-07-05 ASSESSMENT — PAIN DESCRIPTION - LOCATION: LOCATION: HEAD

## 2023-07-05 ASSESSMENT — PAIN - FUNCTIONAL ASSESSMENT: PAIN_FUNCTIONAL_ASSESSMENT: 0-10

## 2023-07-05 NOTE — DISCHARGE INSTRUCTIONS
Be sure to keep the wounds clean by using warm, soapy water daily. Apply bacitracin or Neosporin ointment to the wounds daily. Rest in a quiet, dark room to avoid any excessive stimulation to allow your brain to rest after hitting your head. Return to the ER if any new or worsening symptoms. You will need to follow up with a dentist this week for the fractured tooth. Dental Services     The Emergency Department is not able to provide dental services - tooth extractions, root canal. Though we can provide antibiotics for abccess or infection. Listed below are free or low-cost options. THE [address]   [phone]  Tuesday and Thursday- registration starts at 10am. After registering you are given a time to return  Provides free x-rays, extractions, treatment of infection, and dental hygeine. Cannot have medicare, medicaid or other medical insurance coverage  Bring proof of [facility] residency (power bill, for example), Social Security Number and household income documents (including food stamps) as well as any current medications. (**if you do not live in [facility], contact the free clinic in your home county for the availability of dental services)    [address] [place] [address]  [address] [phone]  Monday and Wednesday 8a-5p  Tuesday and Thursday    8a-7p  Friday                               12-5p  Provides Adult and Childrens services. X-rays, extractions, treatment of infection, restorative care  Accepts medicaid, private insurance, self-pay.  Fees are on a sliding scale based on income (need to bring documentation)    [facility] [address] [facility] [address]     [phone]  Monday - Friday 8am-5p  Accepts medicaid for dental (but not dentures under 21)  Provides xrays, extractions, partials and dentures    [facility] [address], [address],
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
## 2025-06-02 ENCOUNTER — HOSPITAL ENCOUNTER (EMERGENCY)
Age: 25
Discharge: HOME | End: 2025-06-02
Attending: STUDENT IN AN ORGANIZED HEALTH CARE EDUCATION/TRAINING PROGRAM

## 2025-06-02 VITALS
TEMPERATURE: 98.7 F | SYSTOLIC BLOOD PRESSURE: 114 MMHG | DIASTOLIC BLOOD PRESSURE: 75 MMHG | HEIGHT: 69 IN | HEART RATE: 85 BPM | WEIGHT: 137 LBS | OXYGEN SATURATION: 96 % | RESPIRATION RATE: 16 BRPM | BODY MASS INDEX: 20.29 KG/M2

## 2025-06-02 DIAGNOSIS — S71.111A LACERATION OF RIGHT THIGH, INITIAL ENCOUNTER: Primary | ICD-10-CM

## 2025-06-02 PROCEDURE — 99282 EMERGENCY DEPT VISIT SF MDM: CPT

## 2025-06-02 PROCEDURE — 12001 RPR S/N/AX/GEN/TRNK 2.5CM/<: CPT

## 2025-06-02 ASSESSMENT — PAIN SCALES - GENERAL
PAINLEVEL_OUTOF10: 5
PAINLEVEL_OUTOF10: 0

## 2025-06-02 ASSESSMENT — PAIN - FUNCTIONAL ASSESSMENT: PAIN_FUNCTIONAL_ASSESSMENT: 0-10

## 2025-06-02 ASSESSMENT — LIFESTYLE VARIABLES
HOW MANY STANDARD DRINKS CONTAINING ALCOHOL DO YOU HAVE ON A TYPICAL DAY: 3 OR 4
HOW OFTEN DO YOU HAVE A DRINK CONTAINING ALCOHOL: 2-4 TIMES A MONTH

## 2025-06-02 ASSESSMENT — PAIN DESCRIPTION - LOCATION: LOCATION: LEG

## 2025-06-02 NOTE — DISCHARGE INSTRUCTIONS
The stitches will need to come out in 7 to 10 days.  Keep the wound clean and dry.  Return to the ER if any new or worsening symptoms.
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
